# Patient Record
Sex: FEMALE | Race: WHITE | NOT HISPANIC OR LATINO | Employment: OTHER | URBAN - METROPOLITAN AREA
[De-identification: names, ages, dates, MRNs, and addresses within clinical notes are randomized per-mention and may not be internally consistent; named-entity substitution may affect disease eponyms.]

---

## 2024-06-27 RX ORDER — LISINOPRIL 20 MG/1
TABLET ORAL
COMMUNITY
End: 2024-06-28 | Stop reason: ALTCHOICE

## 2024-06-27 RX ORDER — SIMVASTATIN 20 MG
20 TABLET ORAL DAILY
COMMUNITY
End: 2024-06-28 | Stop reason: ALTCHOICE

## 2024-06-27 RX ORDER — NIFEDIPINE 30 MG
TABLET, EXTENDED RELEASE ORAL
COMMUNITY
End: 2024-06-28 | Stop reason: ALTCHOICE

## 2024-06-27 RX ORDER — VALSARTAN 320 MG/1
320 TABLET ORAL DAILY
COMMUNITY
End: 2024-06-28 | Stop reason: ALTCHOICE

## 2024-06-27 RX ORDER — FUROSEMIDE 80 MG
TABLET ORAL
COMMUNITY
Start: 2024-02-05

## 2024-06-27 RX ORDER — ACETAMINOPHEN 500 MG
1000 TABLET ORAL
COMMUNITY

## 2024-06-27 RX ORDER — TOBRAMYCIN 3 MG/ML
SOLUTION/ DROPS OPHTHALMIC
COMMUNITY
End: 2024-06-28 | Stop reason: ALTCHOICE

## 2024-06-27 RX ORDER — GABAPENTIN 100 MG/1
CAPSULE ORAL
COMMUNITY
End: 2024-06-28 | Stop reason: ALTCHOICE

## 2024-06-27 RX ORDER — BISOPROLOL FUMARATE 5 MG/1
2.5 TABLET, FILM COATED ORAL DAILY
COMMUNITY

## 2024-06-27 RX ORDER — NAPROXEN SODIUM 220 MG
1 TABLET ORAL DAILY
COMMUNITY

## 2024-06-27 RX ORDER — LEVOTHYROXINE SODIUM 0.15 MG/1
150 TABLET ORAL DAILY
COMMUNITY
End: 2024-06-28 | Stop reason: DRUGHIGH

## 2024-06-27 RX ORDER — ROSUVASTATIN CALCIUM 20 MG/1
20 TABLET, COATED ORAL
COMMUNITY

## 2024-06-27 RX ORDER — HYDROCHLOROTHIAZIDE 25 MG/1
25 TABLET ORAL DAILY
COMMUNITY
End: 2024-06-28 | Stop reason: ALTCHOICE

## 2024-06-27 RX ORDER — PREDNISOLONE ACETATE 10 MG/ML
SUSPENSION/ DROPS OPHTHALMIC
COMMUNITY

## 2024-06-27 RX ORDER — SIMVASTATIN 20 MG
TABLET ORAL
COMMUNITY
End: 2024-06-28 | Stop reason: ALTCHOICE

## 2024-06-27 RX ORDER — LOSARTAN POTASSIUM 50 MG/1
50 TABLET ORAL DAILY
COMMUNITY
End: 2024-06-28 | Stop reason: ALTCHOICE

## 2024-06-27 RX ORDER — PANTOPRAZOLE SODIUM 40 MG/1
TABLET, DELAYED RELEASE ORAL
COMMUNITY

## 2024-06-28 ENCOUNTER — TELEPHONE (OUTPATIENT)
Dept: ADMINISTRATIVE | Facility: OTHER | Age: 74
End: 2024-06-28

## 2024-06-28 ENCOUNTER — OFFICE VISIT (OUTPATIENT)
Age: 74
End: 2024-06-28
Payer: COMMERCIAL

## 2024-06-28 VITALS
DIASTOLIC BLOOD PRESSURE: 63 MMHG | OXYGEN SATURATION: 95 % | WEIGHT: 208.4 LBS | BODY MASS INDEX: 35.58 KG/M2 | SYSTOLIC BLOOD PRESSURE: 104 MMHG | HEIGHT: 64 IN | HEART RATE: 76 BPM

## 2024-06-28 DIAGNOSIS — Z13.820 SCREENING FOR OSTEOPOROSIS: ICD-10-CM

## 2024-06-28 DIAGNOSIS — Z78.0 ASYMPTOMATIC MENOPAUSAL STATE: ICD-10-CM

## 2024-06-28 DIAGNOSIS — I48.20 CHRONIC ATRIAL FIBRILLATION (HCC): ICD-10-CM

## 2024-06-28 DIAGNOSIS — E06.3 HYPOTHYROIDISM DUE TO HASHIMOTO'S THYROIDITIS: ICD-10-CM

## 2024-06-28 DIAGNOSIS — I10 PRIMARY HYPERTENSION: Primary | ICD-10-CM

## 2024-06-28 DIAGNOSIS — Z11.59 NEED FOR HEPATITIS C SCREENING TEST: ICD-10-CM

## 2024-06-28 DIAGNOSIS — E78.00 HYPERCHOLESTEROLEMIA: ICD-10-CM

## 2024-06-28 DIAGNOSIS — E03.8 HYPOTHYROIDISM DUE TO HASHIMOTO'S THYROIDITIS: ICD-10-CM

## 2024-06-28 PROBLEM — I71.40 AAA (ABDOMINAL AORTIC ANEURYSM) (HCC): Chronic | Status: ACTIVE | Noted: 2019-05-13

## 2024-06-28 PROBLEM — Z80.0 FAMILY HISTORY OF MALIGNANT NEOPLASM OF COLON: Status: ACTIVE | Noted: 2019-01-21

## 2024-06-28 PROBLEM — F32.A DEPRESSIVE DISORDER: Status: ACTIVE | Noted: 2018-10-01

## 2024-06-28 PROBLEM — N28.9 RENAL INSUFFICIENCY: Status: ACTIVE | Noted: 2023-05-11

## 2024-06-28 PROBLEM — R01.1 MURMUR: Status: ACTIVE | Noted: 2018-03-16

## 2024-06-28 PROBLEM — Z86.0100 HISTORY OF COLONIC POLYPS: Status: ACTIVE | Noted: 2022-06-07

## 2024-06-28 PROBLEM — Z98.890 HISTORY OF NEPHROLITHOTOMY WITH REMOVAL OF CALCULI: Status: ACTIVE | Noted: 2018-09-19

## 2024-06-28 PROBLEM — K57.30 DIVERTICULAR DISEASE OF COLON: Status: ACTIVE | Noted: 2022-09-12

## 2024-06-28 PROBLEM — K58.0 IRRITABLE BOWEL SYNDROME WITH DIARRHEA: Status: ACTIVE | Noted: 2022-06-07

## 2024-06-28 PROBLEM — Z86.010 HISTORY OF COLONIC POLYPS: Status: ACTIVE | Noted: 2022-06-07

## 2024-06-28 PROBLEM — I50.9 CONGESTIVE HEART FAILURE (HCC): Status: ACTIVE | Noted: 2021-04-28

## 2024-06-28 PROBLEM — K21.9 GASTROESOPHAGEAL REFLUX DISEASE WITHOUT ESOPHAGITIS: Status: ACTIVE | Noted: 2018-03-16

## 2024-06-28 PROBLEM — I48.91 ATRIAL FIBRILLATION (HCC): Status: ACTIVE | Noted: 2018-03-16

## 2024-06-28 PROBLEM — I82.402 DEEP VEIN THROMBOSIS (DVT) OF LEFT LOWER EXTREMITY (HCC): Status: ACTIVE | Noted: 2021-04-28

## 2024-06-28 PROBLEM — S12.031A CLOSED NONDISPLACED FRACTURE OF POSTERIOR ARCH OF FIRST CERVICAL VERTEBRA (HCC): Chronic | Status: ACTIVE | Noted: 2019-02-04

## 2024-06-28 PROBLEM — I71.20 ANEURYSM OF THORACIC AORTA (HCC): Status: ACTIVE | Noted: 2018-03-16

## 2024-06-28 PROBLEM — Z87.442 HISTORY OF NEPHROLITHOTOMY WITH REMOVAL OF CALCULI: Status: ACTIVE | Noted: 2018-09-19

## 2024-06-28 PROBLEM — R91.1 SOLITARY PULMONARY NODULE: Status: ACTIVE | Noted: 2019-02-04

## 2024-06-28 PROCEDURE — 99214 OFFICE O/P EST MOD 30 MIN: CPT | Performed by: FAMILY MEDICINE

## 2024-06-28 PROCEDURE — G2211 COMPLEX E/M VISIT ADD ON: HCPCS | Performed by: FAMILY MEDICINE

## 2024-06-28 RX ORDER — LEVOTHYROXINE SODIUM 0.12 MG/1
TABLET ORAL
COMMUNITY
Start: 2024-06-27

## 2024-06-28 NOTE — TELEPHONE ENCOUNTER
----- Message from Mariana HUDSON sent at 6/28/2024  7:11 AM EDT -----  Regarding: Care Gap Request  Medicare AWV  06/28/24 7:11 AM    Hello, our patient attached above has had Medicare AWV completed/performed. Please assist in updating the patient chart by pulling the Care Everywhere (CE) document. The date of service is 2/4/2024.     Thank you,  Mariana Deleon [facility]

## 2024-06-28 NOTE — TELEPHONE ENCOUNTER
Upon review of the In Basket request we were able to locate, review, and update the patient chart as requested for Medicare AW.    Any additional questions or concerns should be emailed to the Practice Liaisons via the appropriate education email address, please do not reply via In Basket.    Thank you  Ester Greer MA   [facility]

## 2024-06-28 NOTE — PROGRESS NOTES
"Ambulatory Visit  Name: Marlene Watt      : 1950      MRN: [id number]  Encounter Provider: Cain Wood MD  Encounter Date: 2024   Encounter department: [facility]    Assessment & Plan   1. Primary hypertension  Assessment & Plan:  DASH diet (low saturated fat, cholesterol, and total fat; increase fruits and vegetables; fat free or low fat milk or milk products; and increased fiber). Aerobic exercise and limitation of sodium. Weight loss. Continue same medications.   Orders:  -     Basic metabolic panel; Future; Expected date: 2024  2. Hypercholesterolemia  Assessment & Plan:  Low cholesterol diet. Encourage vegetables, fruit, and whole grains. Exercise.  Continue statin.  Orders:  -     Lipid panel; Future  3. Chronic atrial fibrillation (HCC)  Assessment & Plan:  Continue Xarelto, beta blocker, Calcium channel blocker.  4. Hypothyroidism due to Hashimoto's thyroiditis  Assessment & Plan:  Continue synthroid.  5. Need for hepatitis C screening test  -     Hepatitis C Antibody; Future  6. Screening for osteoporosis  -     DXA bone density spine hip and pelvis; Future; Expected date: 2024  7. Asymptomatic menopausal state  -     DXA bone density spine hip and pelvis; Future; Expected date: 2024    [unfilled]  History of Present Illness     @SFI@    @[facility]@    Objective     /63 (BP Location: Right arm, Patient Position: Sitting, Cuff Size: Standard)   Pulse 76   Ht 5' 4\" (1.626 m)   Wt 94.5 kg (208 lb 6.4 oz)   SpO2 95%   BMI 35.77 kg/m²     [unfilled]  Administrative Statements         Ambulatory Visit  Name: Marlene Watt      : 1950      MRN: [id number]  Encounter Provider: Cain Wood MD  Encounter Date: 2024   Encounter department: [facility]    Assessment & Plan   1. Primary hypertension  Assessment & Plan:  DASH diet (low saturated fat, cholesterol, and total fat; increase fruits and " "vegetables; fat free or low fat milk or milk products; and increased fiber). Aerobic exercise and limitation of sodium. Weight loss. Continue same medications.   Orders:  -     Basic metabolic panel; Future; Expected date: 06/28/2024  2. Hypercholesterolemia  Assessment & Plan:  Low cholesterol diet. Encourage vegetables, fruit, and whole grains. Exercise.  Continue statin.  Orders:  -     Lipid panel; Future  3. Chronic atrial fibrillation (HCC)  Assessment & Plan:  Continue Xarelto, beta blocker, Calcium channel blocker.  4. Hypothyroidism due to Hashimoto's thyroiditis  Assessment & Plan:  Continue synthroid.  5. Need for hepatitis C screening test  -     Hepatitis C Antibody; Future  6. Screening for osteoporosis  -     DXA bone density spine hip and pelvis; Future; Expected date: 07/28/2024  7. Asymptomatic menopausal state  -     DXA bone density spine hip and pelvis; Future; Expected date: 07/28/2024       History of Present Illness       Marlene Watt is here for chronic conditions f/u. Denies chest pain, shortness of breath, headache, or visual symptoms. Reviewed and updated PMHx, PSHx, Family Hx, Allergies, and Meds.  Eating healthy. Walking fair. Gets out of breath easy with the heat.         Review of Systems   Constitutional:  Negative for fatigue.   Respiratory:  Negative for shortness of breath (Exertional).    Cardiovascular:  Negative for chest pain.   Gastrointestinal:  Negative for abdominal pain, constipation and diarrhea.   Genitourinary:  Negative for dysuria.   Neurological:  Negative for dizziness.       Objective     /63 (BP Location: Right arm, Patient Position: Sitting, Cuff Size: Standard)   Pulse 76   Ht 5' 4\" (1.626 m)   Wt 94.5 kg (208 lb 6.4 oz)   SpO2 95%   BMI 35.77 kg/m²     Physical Exam  Vitals and nursing note reviewed.   Constitutional:       Appearance: She is well-developed.   HENT:      Head: Normocephalic and atraumatic.   Eyes:      Conjunctiva/sclera: " Conjunctivae normal.   Cardiovascular:      Rate and Rhythm: Normal rate and regular rhythm.      Heart sounds: Murmur heard.   Pulmonary:      Breath sounds: Normal breath sounds.   Abdominal:      Palpations: Abdomen is soft.   Musculoskeletal:      Cervical back: Neck supple.   Skin:     General: Skin is warm and dry.   Neurological:      Mental Status: She is alert.   Psychiatric:         Mood and Affect: Mood normal.       Administrative Statements

## 2024-08-30 DIAGNOSIS — I48.20 CHRONIC ATRIAL FIBRILLATION (HCC): Primary | ICD-10-CM

## 2024-09-16 DIAGNOSIS — I50.9 CHRONIC CONGESTIVE HEART FAILURE, UNSPECIFIED HEART FAILURE TYPE (HCC): ICD-10-CM

## 2024-09-16 DIAGNOSIS — E06.3 HYPOTHYROIDISM DUE TO HASHIMOTO'S THYROIDITIS: ICD-10-CM

## 2024-09-16 DIAGNOSIS — E78.00 HYPERCHOLESTEROLEMIA: ICD-10-CM

## 2024-09-16 DIAGNOSIS — K21.9 GASTROESOPHAGEAL REFLUX DISEASE WITHOUT ESOPHAGITIS: ICD-10-CM

## 2024-09-16 DIAGNOSIS — F32.A DEPRESSIVE DISORDER: Primary | ICD-10-CM

## 2024-09-16 DIAGNOSIS — E03.8 HYPOTHYROIDISM DUE TO HASHIMOTO'S THYROIDITIS: ICD-10-CM

## 2024-09-16 RX ORDER — LEVOTHYROXINE SODIUM 125 UG/1
125 TABLET ORAL DAILY
Qty: 90 TABLET | Refills: 1 | Status: SHIPPED | OUTPATIENT
Start: 2024-09-16

## 2024-09-16 RX ORDER — PANTOPRAZOLE SODIUM 40 MG/1
40 TABLET, DELAYED RELEASE ORAL DAILY
Qty: 90 TABLET | Refills: 1 | Status: SHIPPED | OUTPATIENT
Start: 2024-09-16

## 2024-09-16 RX ORDER — ROSUVASTATIN CALCIUM 20 MG/1
20 TABLET, COATED ORAL DAILY
Qty: 90 TABLET | Refills: 1 | Status: SHIPPED | OUTPATIENT
Start: 2024-09-16

## 2024-09-16 RX ORDER — FUROSEMIDE 80 MG
80 TABLET ORAL 2 TIMES DAILY
Qty: 180 TABLET | Refills: 1 | Status: SHIPPED | OUTPATIENT
Start: 2024-09-16

## 2024-10-24 DIAGNOSIS — I48.20 CHRONIC ATRIAL FIBRILLATION (HCC): ICD-10-CM

## 2024-10-24 RX ORDER — RIVAROXABAN 20 MG/1
TABLET, FILM COATED ORAL
Qty: 90 TABLET | Refills: 0 | Status: SHIPPED | OUTPATIENT
Start: 2024-10-24 | End: 2024-10-24 | Stop reason: SDUPTHER

## 2024-10-31 ENCOUNTER — RA CDI HCC (OUTPATIENT)
Dept: OTHER | Facility: HOSPITAL | Age: 74
End: 2024-10-31

## 2024-10-31 NOTE — PROGRESS NOTES
HCC coding opportunities          Chart Reviewed number of suggestions sent to Provider: 1     Patients Insurance     Medicare Insurance: [employer] Medicare Advantage          I11.0: Hypertensive heart disease with heart failure (HCC)    Per ICD 10 CM coding guidelines the classification presumes a causal relationship between hypertension and heart involvement and between hypertension unless the documentation clearly states the conditions are unrelated

## 2024-11-18 LAB — HCV AB SER-ACNC: NORMAL

## 2024-11-21 ENCOUNTER — OFFICE VISIT (OUTPATIENT)
Age: 74
End: 2024-11-21
Payer: COMMERCIAL

## 2024-11-21 VITALS
BODY MASS INDEX: 36.54 KG/M2 | OXYGEN SATURATION: 98 % | SYSTOLIC BLOOD PRESSURE: 110 MMHG | HEART RATE: 70 BPM | WEIGHT: 214 LBS | DIASTOLIC BLOOD PRESSURE: 62 MMHG | RESPIRATION RATE: 18 BRPM | HEIGHT: 64 IN

## 2024-11-21 DIAGNOSIS — I48.20 CHRONIC ATRIAL FIBRILLATION (HCC): ICD-10-CM

## 2024-11-21 DIAGNOSIS — G56.01 CARPAL TUNNEL SYNDROME OF RIGHT WRIST: ICD-10-CM

## 2024-11-21 DIAGNOSIS — I10 PRIMARY HYPERTENSION: Primary | ICD-10-CM

## 2024-11-21 DIAGNOSIS — E78.00 HYPERCHOLESTEROLEMIA: ICD-10-CM

## 2024-11-21 DIAGNOSIS — Z23 ENCOUNTER FOR IMMUNIZATION: ICD-10-CM

## 2024-11-21 DIAGNOSIS — E06.3 HYPOTHYROIDISM DUE TO HASHIMOTO THYROIDITIS: ICD-10-CM

## 2024-11-21 PROCEDURE — 90662 IIV NO PRSV INCREASED AG IM: CPT | Performed by: FAMILY MEDICINE

## 2024-11-21 PROCEDURE — 99214 OFFICE O/P EST MOD 30 MIN: CPT | Performed by: FAMILY MEDICINE

## 2024-11-21 PROCEDURE — G0008 ADMIN INFLUENZA VIRUS VAC: HCPCS | Performed by: FAMILY MEDICINE

## 2024-11-21 NOTE — PROGRESS NOTES
Name: Marlene Watt      : 1950      MRN: [id number]  Encounter Provider: Cain Wood MD  Encounter Date: 2024   Encounter department: [facility] PRIMARY [facility]  :  Assessment & Plan  Primary hypertension  DASH diet (low saturated fat, cholesterol, and total fat; increase fruits and vegetables; fat free or low fat milk or milk products; and increased fiber). Aerobic exercise and limitation of sodium. Weight loss. Continue beta blocker.         Chronic atrial fibrillation (HCC)  Continue beta blocker and xarelto.         Hypercholesterolemia  Low cholesterol diet. Encourage vegetables, fruit, and whole grains. Exercise.  Continue statin.         Hypothyroidism due to Hashimoto thyroiditis  Continue synthroid.         Carpal tunnel syndrome of right wrist  Wrist splint, tylenol  Orders:    Cock Up Wrist Splint    Encounter for immunization    Orders:    influenza vaccine, high-dose, PF 0.5 mL (Fluzone High Dose)           History of Present Illness       Marlene Watt is here for chronic conditions f/u. Denies chest pain, shortness of breath, headache, or visual symptoms. Reviewed and updated PMHx, PSHx, Family Hx, Allergies, and Meds.  Recently moved to [employer]. Eating healthy. Compliant with meds. Right arm falls asleep. Getting worse.     Date- 24    FBS- 87  crea- 0.84  eGFR- 72  Total Chol- 130  LDL- 63    -Hepatitis C antibody:   Collection Time: 24 12:00 AM       Result                      Value             Ref Range           HEP C AB                    Non-Reactive                          Review of Systems   Constitutional:  Negative for fatigue.   Respiratory:  Negative for shortness of breath.    Cardiovascular:  Negative for chest pain.   Gastrointestinal:  Negative for abdominal pain, constipation and diarrhea.   Genitourinary:  Negative for dysuria.   Neurological:  Positive for numbness (Right arm.). Negative for dizziness.          Objective   BP  "110/62 (BP Location: Right arm, Patient Position: Sitting, Cuff Size: Standard)   Pulse 70   Resp 18   Ht 5' 4\" (1.626 m)   Wt 97.1 kg (214 lb)   LMP  (LMP Unknown)   SpO2 98%   BMI 36.73 kg/m²      Physical Exam  Vitals and nursing note reviewed.   Constitutional:       Appearance: She is well-developed.   HENT:      Head: Normocephalic and atraumatic.   Eyes:      Conjunctiva/sclera: Conjunctivae normal.   Cardiovascular:      Rate and Rhythm: Normal rate and regular rhythm.      Heart sounds: No murmur heard.  Pulmonary:      Breath sounds: Normal breath sounds.   Abdominal:      Palpations: Abdomen is soft.   Musculoskeletal:      Cervical back: Neck supple.   Skin:     General: Skin is warm and dry.   Neurological:      Mental Status: She is alert.      Sensory: Sensory deficit (Subjective numbness right arm.) present.   Psychiatric:         Mood and Affect: Mood normal.         "

## 2024-11-21 NOTE — ASSESSMENT & PLAN NOTE
DASH diet (low saturated fat, cholesterol, and total fat; increase fruits and vegetables; fat free or low fat milk or milk products; and increased fiber). Aerobic exercise and limitation of sodium. Weight loss. Continue beta blocker.

## 2025-01-17 DIAGNOSIS — F32.A DEPRESSIVE DISORDER: ICD-10-CM

## 2025-01-17 DIAGNOSIS — I50.9 CHRONIC CONGESTIVE HEART FAILURE, UNSPECIFIED HEART FAILURE TYPE (HCC): ICD-10-CM

## 2025-01-17 DIAGNOSIS — E06.3 HYPOTHYROIDISM DUE TO HASHIMOTO'S THYROIDITIS: ICD-10-CM

## 2025-01-17 RX ORDER — LEVOTHYROXINE SODIUM 125 UG/1
125 TABLET ORAL DAILY
Qty: 90 TABLET | Refills: 0 | Status: SHIPPED | OUTPATIENT
Start: 2025-01-17 | End: 2025-01-21 | Stop reason: SDUPTHER

## 2025-01-17 RX ORDER — FUROSEMIDE 80 MG/1
80 TABLET ORAL 2 TIMES DAILY
Qty: 180 TABLET | Refills: 1 | Status: SHIPPED | OUTPATIENT
Start: 2025-01-17 | End: 2025-01-21 | Stop reason: SDUPTHER

## 2025-01-21 RX ORDER — FUROSEMIDE 80 MG/1
80 TABLET ORAL 2 TIMES DAILY
Qty: 180 TABLET | Refills: 1 | Status: SHIPPED | OUTPATIENT
Start: 2025-01-21

## 2025-01-21 RX ORDER — LEVOTHYROXINE SODIUM 125 UG/1
125 TABLET ORAL DAILY
Qty: 90 TABLET | Refills: 1 | Status: SHIPPED | OUTPATIENT
Start: 2025-01-21

## 2025-02-03 ENCOUNTER — TELEPHONE (OUTPATIENT)
Age: 75
End: 2025-02-03

## 2025-03-01 DIAGNOSIS — I48.20 CHRONIC ATRIAL FIBRILLATION (HCC): ICD-10-CM

## 2025-03-03 DIAGNOSIS — I48.20 CHRONIC ATRIAL FIBRILLATION (HCC): ICD-10-CM

## 2025-03-03 RX ORDER — RIVAROXABAN 20 MG/1
20 TABLET, FILM COATED ORAL DAILY
Qty: 90 TABLET | Refills: 0 | Status: SHIPPED | OUTPATIENT
Start: 2025-03-03 | End: 2025-03-03 | Stop reason: SDUPTHER

## 2025-03-07 ENCOUNTER — TRANSITIONAL CARE MANAGEMENT (OUTPATIENT)
Age: 75
End: 2025-03-07

## 2025-03-13 ENCOUNTER — TELEPHONE (OUTPATIENT)
Age: 75
End: 2025-03-13

## 2025-03-13 DIAGNOSIS — R30.0 DYSURIA: Primary | ICD-10-CM

## 2025-03-13 NOTE — TELEPHONE ENCOUNTER
Patient called in requesting a script for a urinalysis pt states she thinks she has a urinary tract infection pt is waiting at the lab to have this test done pt would like the script faxed over to   [facility] Ceball[facility] Fax#[phone]  Please Advise

## 2025-03-13 NOTE — TELEPHONE ENCOUNTER
Pt called about labs, she was told they didn't receive the order, I confirmed the fax and refax the order to the lab.

## 2025-03-13 NOTE — TELEPHONE ENCOUNTER
Patient called back and stated that the last fax number was wrong.  Pls fax to .  She was at the cardio doctor and they requested she get tested for a UTI.  She is at the lab now.

## 2025-03-14 ENCOUNTER — RESULTS FOLLOW-UP (OUTPATIENT)
Age: 75
End: 2025-03-14

## 2025-03-14 DIAGNOSIS — N30.00 ACUTE CYSTITIS WITHOUT HEMATURIA: Primary | ICD-10-CM

## 2025-03-14 RX ORDER — GUAIFENESIN 600 MG/1
600 TABLET, EXTENDED RELEASE ORAL EVERY 12 HOURS SCHEDULED
COMMUNITY
Start: 2025-03-07 | End: 2025-03-18 | Stop reason: ALTCHOICE

## 2025-03-14 RX ORDER — AMIODARONE HYDROCHLORIDE 200 MG/1
200 TABLET ORAL 2 TIMES DAILY
COMMUNITY
Start: 2025-03-04

## 2025-03-14 RX ORDER — EMPAGLIFLOZIN 10 MG/1
10 TABLET, FILM COATED ORAL DAILY
COMMUNITY
Start: 2025-03-07 | End: 2025-03-18 | Stop reason: ALTCHOICE

## 2025-03-14 RX ORDER — CIPROFLOXACIN 250 MG/1
250 TABLET, FILM COATED ORAL 2 TIMES DAILY
Qty: 6 TABLET | Refills: 0 | Status: SHIPPED | OUTPATIENT
Start: 2025-03-14 | End: 2025-03-17

## 2025-03-14 NOTE — TELEPHONE ENCOUNTER
Patient called to check on the status of her U/A results. Patient stated the symptoms are getting worse. Patient was informed that she will get a call as soon as the PCP reviews the results.

## 2025-03-18 ENCOUNTER — OFFICE VISIT (OUTPATIENT)
Age: 75
End: 2025-03-18
Payer: COMMERCIAL

## 2025-03-18 VITALS
HEIGHT: 64 IN | WEIGHT: 223.2 LBS | SYSTOLIC BLOOD PRESSURE: 122 MMHG | DIASTOLIC BLOOD PRESSURE: 88 MMHG | HEART RATE: 74 BPM | RESPIRATION RATE: 18 BRPM | BODY MASS INDEX: 38.1 KG/M2 | OXYGEN SATURATION: 95 %

## 2025-03-18 DIAGNOSIS — E78.00 HYPERCHOLESTEROLEMIA: ICD-10-CM

## 2025-03-18 DIAGNOSIS — K21.9 GASTROESOPHAGEAL REFLUX DISEASE WITHOUT ESOPHAGITIS: ICD-10-CM

## 2025-03-18 DIAGNOSIS — I10 PRIMARY HYPERTENSION: ICD-10-CM

## 2025-03-18 DIAGNOSIS — I48.20 CHRONIC ATRIAL FIBRILLATION (HCC): ICD-10-CM

## 2025-03-18 DIAGNOSIS — I50.23 ACUTE ON CHRONIC SYSTOLIC CONGESTIVE HEART FAILURE (HCC): Primary | ICD-10-CM

## 2025-03-18 PROCEDURE — 99495 TRANSJ CARE MGMT MOD F2F 14D: CPT | Performed by: FAMILY MEDICINE

## 2025-03-18 RX ORDER — BISOPROLOL FUMARATE 5 MG/1
5 TABLET, FILM COATED ORAL DAILY
Qty: 90 TABLET | Refills: 1 | Status: SHIPPED | OUTPATIENT
Start: 2025-03-18

## 2025-03-18 RX ORDER — POTASSIUM CHLORIDE 1500 MG/1
20 TABLET, EXTENDED RELEASE ORAL DAILY
COMMUNITY

## 2025-03-18 NOTE — ASSESSMENT & PLAN NOTE
DASH diet (low saturated fat, cholesterol, and total fat; increase fruits and vegetables; fat free or low fat milk or milk products; and increased fiber). Aerobic exercise and limitation of sodium. Weight loss.

## 2025-03-18 NOTE — ASSESSMENT & PLAN NOTE
Had successful cardioversion. Continue same meds. On amiodarone, bisoprolol, and Xarelto.  Orders:  •  bisoprolol (ZEBETA) 5 mg tablet; Take 1 tablet (5 mg total) by mouth daily

## 2025-03-18 NOTE — PROGRESS NOTES
Transition of Care Visit  Name: Marlene Watt      : 1950      MRN: [id number]  Encounter Provider: Cain Wood MD  Encounter Date: 3/18/2025   Encounter department: [facility] PRIMARY CARE    Assessment & Plan  Acute on chronic systolic congestive heart failure (HCC)  Wt Readings from Last 3 Encounters:   25 101 kg (223 lb 3.2 oz)   24 97.1 kg (214 lb)   24 94.5 kg (208 lb 6.4 oz)     Daily weights.  Continue same meds as per cardiology. Meds updated.   Continue lasix and bisoprolol.         Orders:  •  bisoprolol (ZEBETA) 5 mg tablet; Take 1 tablet (5 mg total) by mouth daily    Chronic atrial fibrillation (HCC)  Had successful cardioversion. Continue same meds. On amiodarone, bisoprolol, and Xarelto.  Orders:  •  bisoprolol (ZEBETA) 5 mg tablet; Take 1 tablet (5 mg total) by mouth daily    Primary hypertension  DASH diet (low saturated fat, cholesterol, and total fat; increase fruits and vegetables; fat free or low fat milk or milk products; and increased fiber). Aerobic exercise and limitation of sodium. Weight loss.             History of Present Illness     Transitional Care Management Review:   Marlene Watt is a 74 y.o. female here for TCM follow up.     During the TCM phone call patient stated:  TCM Call (since 3/4/2025)     Date and time call was made  3/7/2025  8:57 AM    Hospital care reviewed  Records reviewed    Patient was hospitialized at  Other (comment)    Comment  [facility]    Date of Admission  25    Date of discharge  25    Diagnosis  Heart Failure    Disposition  Home    Were the patients medications reviewed and updated  Yes    Current Symptoms  None      TCM Call (since 3/4/2025)     Post hospital issues  None    Should patient be enrolled in anticoag monitoring?  No    Scheduled for follow up?  Yes    Did you obtain your prescribed medications  Yes    Do you need help managing your prescriptions or medications  No    Is  "transportation to your appointment needed  No    I have advised the patient to call PCP with any new or worsening symptoms  CLARICE Chua          Marlene Watt is here for transition visit. Was admitted within 14 days ago with SOB and edema and diagnosed with acute on chronic CHF with LV dysfunction. Was given IV bumex. Also with A fib sith successful OSMAN cardioversion on 3/6/25, Had history of ablation on 2020. May be getting another ablation by Dr. Saeed.       Review of Systems   Constitutional:  Negative for fatigue.   Respiratory:  Positive for shortness of breath (Exertional).    Cardiovascular:  Negative for chest pain.   Gastrointestinal:  Negative for abdominal pain, constipation and diarrhea.   Genitourinary:  Negative for dysuria.   Neurological:  Negative for dizziness.     Objective   /88 (BP Location: Right arm, Patient Position: Sitting, Cuff Size: Standard)   Pulse 74   Resp 18   Ht 5' 4\" (1.626 m)   Wt 101 kg (223 lb 3.2 oz)   SpO2 95%   BMI 38.31 kg/m²     Physical Exam  Vitals and nursing note reviewed.   Constitutional:       Appearance: She is well-developed.   HENT:      Head: Normocephalic and atraumatic.   Eyes:      Conjunctiva/sclera: Conjunctivae normal.   Cardiovascular:      Rate and Rhythm: Normal rate and regular rhythm.      Heart sounds: No murmur heard.  Pulmonary:      Breath sounds: Normal breath sounds.   Abdominal:      Palpations: Abdomen is soft.   Musculoskeletal:      Cervical back: Neck supple.   Skin:     General: Skin is warm and dry.   Neurological:      Mental Status: She is alert.   Psychiatric:         Mood and Affect: Mood normal.           "

## 2025-03-18 NOTE — ASSESSMENT & PLAN NOTE
Wt Readings from Last 3 Encounters:   03/18/25 101 kg (223 lb 3.2 oz)   11/21/24 97.1 kg (214 lb)   06/28/24 94.5 kg (208 lb 6.4 oz)     Daily weights.  Continue same meds as per cardiology. Meds updated.   Continue lasix and bisoprolol.         Orders:  •  bisoprolol (ZEBETA) 5 mg tablet; Take 1 tablet (5 mg total) by mouth daily

## 2025-03-19 RX ORDER — ROSUVASTATIN CALCIUM 20 MG/1
20 TABLET, COATED ORAL DAILY
Qty: 90 TABLET | Refills: 1 | Status: SHIPPED | OUTPATIENT
Start: 2025-03-19

## 2025-03-19 RX ORDER — PANTOPRAZOLE SODIUM 40 MG/1
40 TABLET, DELAYED RELEASE ORAL DAILY
Qty: 90 TABLET | Refills: 1 | Status: SHIPPED | OUTPATIENT
Start: 2025-03-19

## 2025-03-25 ENCOUNTER — RA CDI HCC (OUTPATIENT)
Dept: OTHER | Facility: HOSPITAL | Age: 75
End: 2025-03-25

## 2025-03-25 NOTE — PROGRESS NOTES
HCC coding opportunities          Chart Reviewed number of suggestions sent to Provider: 2     Patients Insurance     Medicare Insurance: [employer] Medicare Advantage          1) I11.0: Hypertensive heart disease with heart failure (HCC)    Per ICD 10 CM coding guidelines the classification presumes a causal relationship between hypertension and heart involvement and between hypertension unless the documentation clearly states the conditions are unrelated    2) found in active problem list and medication list (sertraline (ZOLOFT) 50 mg tablet ) - Can Depression (F33.9) be further classified using any of these diagnosis below.    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

## 2025-04-15 ENCOUNTER — OFFICE VISIT (OUTPATIENT)
Age: 75
End: 2025-04-15
Payer: COMMERCIAL

## 2025-04-15 VITALS
HEIGHT: 64 IN | DIASTOLIC BLOOD PRESSURE: 80 MMHG | SYSTOLIC BLOOD PRESSURE: 110 MMHG | HEART RATE: 64 BPM | BODY MASS INDEX: 46.78 KG/M2 | WEIGHT: 274 LBS | RESPIRATION RATE: 18 BRPM | OXYGEN SATURATION: 95 %

## 2025-04-15 DIAGNOSIS — I10 PRIMARY HYPERTENSION: ICD-10-CM

## 2025-04-15 DIAGNOSIS — C56.9 MALIGNANT NEOPLASM OF OVARY, UNSPECIFIED LATERALITY (HCC): ICD-10-CM

## 2025-04-15 DIAGNOSIS — I48.20 CHRONIC ATRIAL FIBRILLATION (HCC): ICD-10-CM

## 2025-04-15 DIAGNOSIS — Z00.00 MEDICARE ANNUAL WELLNESS VISIT, SUBSEQUENT: Primary | ICD-10-CM

## 2025-04-15 DIAGNOSIS — Z78.0 ASYMPTOMATIC MENOPAUSAL STATE: ICD-10-CM

## 2025-04-15 DIAGNOSIS — N18.32 CHRONIC RENAL FAILURE, STAGE 3B (HCC): ICD-10-CM

## 2025-04-15 DIAGNOSIS — E78.00 HYPERCHOLESTEROLEMIA: ICD-10-CM

## 2025-04-15 PROCEDURE — G0439 PPPS, SUBSEQ VISIT: HCPCS | Performed by: FAMILY MEDICINE

## 2025-04-15 NOTE — PROGRESS NOTES
Name: Marlene Watt      : 1950      MRN: [id number]  Encounter Provider: Cain Wood MD  Encounter Date: 4/15/2025   Encounter department: [facility] PRIMARY CARE  :  Assessment & Plan  Medicare annual wellness visit, subsequent  Dexa ordered.       Primary hypertension  DASH diet (low saturated fat, cholesterol, and total fat; increase fruits and vegetables; fat free or low fat milk or milk products; and increased fiber). Aerobic exercise and limitation of sodium. Weight loss.   Orders:  •  Basic metabolic panel; Future    Chronic atrial fibrillation (HCC)  Continue amiodarone, zebeta, and Xarelto. Had cardioversion. May be getting ablation.       Hypercholesterolemia  Low cholesterol diet. Encourage vegetables, fruit, and whole grains. Exercise.  Continue crestor.  Orders:  •  Lipid panel; Future  •  AST; Future    Chronic renal failure, stage 3b (HCC)  Renal labs.    Lab Results   Component Value Date    EGFR >60.0 2019    EGFR NOT CALCULATED 2019    EGFR NOT CALCULATED 2019    CREATININE 0.7 2019    CREATININE 0.7 2019    CREATININE 0.7 2019     Orders:  •  Calcium; Future  •  Phosphorus; Future  •  PTH, intact; Future    Malignant neoplasm of ovary, unspecified laterality (HCC)  Had surgery in 20s.  Had TAHBSO.        Asymptomatic menopausal state    Orders:  •  DXA bone density spine hip and pelvis; Future       Preventive health issues were discussed with patient, and age appropriate screening tests were ordered as noted in patient's After Visit Summary. Personalized health advice and appropriate referrals for health education or preventive services given if needed, as noted in patient's After Visit Summary.    History of Present Illness       Marlene Watt is here for Medicare annual wellness exam. Denies chest pain, shortness of breath, headache, or visual symptoms. Reviewed and updated PMHx, PSHx, Family Hx, Allergies, and Meds.  Had  cardioversion last Wedness.     Date- 4/4/25 Rony    FBS- 71  crea- 1.39  eGFR- 39  CBC WNL       Patient Care Team:  Cain Wood MD as PCP - General (Family Medicine)    Review of Systems   Constitutional:  Negative for fatigue.   Respiratory:  Negative for shortness of breath.    Cardiovascular:  Negative for chest pain.   Gastrointestinal:  Negative for abdominal pain, constipation and diarrhea.   Genitourinary:  Negative for dysuria.   Neurological:  Negative for dizziness.     Medical History Reviewed by provider this encounter:  Tobacco  Allergies  Meds  Problems  Med Hx  Surg Hx  Fam Hx       Annual Wellness Visit Questionnaire   Marlene is here for her Subsequent Wellness visit. Last Medicare Wellness visit information reviewed, patient interviewed, no change since last AWV.     Health Risk Assessment:   Patient rates overall health as good. Patient feels that their physical health rating is same. Patient is satisfied with their life. Eyesight was rated as same. Hearing was rated as slightly worse. Patient feels that their emotional and mental health rating is slightly better. Patients states they are never, rarely angry. Patient states they are often unusually tired/fatigued. Pain experienced in the last 7 days has been some. Patient's pain rating has been 8/10. Patient states that she has experienced weight loss or gain in last 6 months.     Depression Screening:   PHQ-9 Score: 0      Fall Risk Screening:   In the past year, patient has experienced: history of falling in past year    Number of falls: 1  Injured during fall?: No    Feels unsteady when standing or walking?: Yes    Worried about falling?: Yes      Urinary Incontinence Screening:   Patient has leaked urine accidently in the last six months.     Home Safety:  Patient has trouble with stairs inside or outside of their home. Patient has working smoke alarms and has working carbon monoxide detector. Home safety hazards include: none.      Nutrition:   Current diet is No Added Salt.     Medications:   Patient is currently taking over-the-counter supplements. OTC medications include: see medication list. Patient is able to manage medications.     Activities of Daily Living (ADLs)/Instrumental Activities of Daily Living (IADLs):   Walk and transfer into and out of bed and chair?: Yes  Dress and groom yourself?: Yes    Bathe or shower yourself?: Yes    Feed yourself? Yes  Do your laundry/housekeeping?: Yes  Manage your money, pay your bills and track your expenses?: Yes  Make your own meals?: Yes    Do your own shopping?: Yes    Previous Hospitalizations:   Any hospitalizations or ED visits within the last 12 months?: Yes    How many hospitalizations have you had in the last year?: 3-4    Advance Care Planning:   Living will: No    Durable POA for healthcare: No    Advanced directive: No    Advanced directive counseling given: Yes    ACP document given: Yes    Patient declined ACP directive: No    End of Life Decisions reviewed with patient: Yes    Provider agrees with end of life decisions: Yes      Cognitive Screening:   Provider or family/friend/caregiver concerned regarding cognition?: No    Preventive Screenings      Cardiovascular Screening:    General: Screening Not Indicated and History Lipid Disorder      Cervical Cancer Screening:    General: Screening Not Indicated      Abdominal Aortic Aneurysm (AAA) Screening:        General: Screening Not Indicated and History AAA      Lung Cancer Screening:     General: Screening Not Indicated      Hepatitis C Screening:    General: Screening Current    Immunizations:  - Immunizations due: Zoster (Shingrix)    Screening, Brief Intervention, and Referral to Treatment (SBIRT)     Screening  Typical number of drinks in a day: 0  Typical number of drinks in a week: 0  Interpretation: Low risk drinking behavior.    AUDIT-C Screenin) How often did you have a drink containing alcohol in the past year?  "never  2) How many drinks did you have on a typical day when you were drinking in the past year? 0  3) How often did you have 6 or more drinks on one occasion in the past year? never    AUDIT-C Score: 0  Interpretation: Score 0-2 (female): Negative screen for alcohol misuse    Single Item Drug Screening:  How often have you used an illegal drug (including marijuana) or a prescription medication for non-medical reasons in the past year? never    Single Item Drug Screen Score: 0  Interpretation: Negative screen for possible drug use disorder    Social Drivers of Health     Food Insecurity: No Food Insecurity (4/15/2025)    Hunger Vital Sign    • Worried About Running Out of Food in the Last Year: Never true    • Ran Out of Food in the Last Year: Never true   Transportation Needs: No Transportation Needs (4/15/2025)    PRAPARE - Transportation    • Lack of Transportation (Medical): No    • Lack of Transportation (Non-Medical): No   Housing Stability: Low Risk  (4/15/2025)    Housing Stability Vital Sign    • Unable to Pay for Housing in the Last Year: No    • Number of Times Moved in the Last Year: 1    • Homeless in the Last Year: No   Utilities: Not At Risk (4/15/2025)    [facility] Utilities    • Threatened with loss of utilities: No     No results found.    Objective   /80 (BP Location: Right arm, Patient Position: Sitting, Cuff Size: Standard)   Pulse 64   Resp 18   Ht 5' 4\" (1.626 m)   Wt 124 kg (274 lb)   LMP  (LMP Unknown)   SpO2 95%   BMI 47.03 kg/m²     Physical Exam  Vitals and nursing note reviewed.   Constitutional:       Appearance: She is well-developed.   HENT:      Head: Normocephalic and atraumatic.   Eyes:      Conjunctiva/sclera: Conjunctivae normal.   Cardiovascular:      Rate and Rhythm: Normal rate and regular rhythm.      Heart sounds: No murmur heard.  Pulmonary:      Breath sounds: Normal breath sounds.   Abdominal:      Palpations: Abdomen is soft.   Musculoskeletal:      Cervical back: " Neck supple.   Skin:     General: Skin is warm and dry.   Neurological:      Mental Status: She is alert.   Psychiatric:         Mood and Affect: Mood normal.

## 2025-04-15 NOTE — ASSESSMENT & PLAN NOTE
Low cholesterol diet. Encourage vegetables, fruit, and whole grains. Exercise.  Continue crestor.  Orders:  •  Lipid panel; Future  •  AST; Future

## 2025-04-15 NOTE — PATIENT INSTRUCTIONS
Medicare Preventive Visit Patient Instructions  Thank you for completing your Welcome to Medicare Visit or Medicare Annual Wellness Visit today. Your next wellness visit will be due in one year (4/16/2026).  The screening/preventive services that you may require over the next 5-10 years are detailed below. Some tests may not apply to you based off risk factors and/or age. Screening tests ordered at today's visit but not completed yet may show as past due. Also, please note that scanned in results may not display below.  Preventive Screenings:  Service Recommendations Previous Testing/Comments   Colorectal Cancer Screening  * Colonoscopy    * Fecal Occult Blood Test (FOBT)/Fecal Immunochemical Test (FIT)  * Fecal DNA/Cologuard Test  * Flexible Sigmoidoscopy Age: 45-75 years old   Colonoscopy: every 10 years (may be performed more frequently if at higher risk)  OR  FOBT/FIT: every 1 year  OR  Cologuard: every 3 years  OR  Sigmoidoscopy: every 5 years  Screening may be recommended earlier than age 45 if at higher risk for colorectal cancer. Also, an individualized decision between you and your healthcare provider will decide whether screening between the ages of 76-85 would be appropriate. Colonoscopy: Not on file  FOBT/FIT: Not on file  Cologuard: Not on file  Sigmoidoscopy: Not on file          Breast Cancer Screening Age: 40+ years old  Frequency: every 1-2 years  Not required if history of left and right mastectomy Mammogram: 07/01/2015        Cervical Cancer Screening Between the ages of 21-29, pap smear recommended once every 3 years.   Between the ages of 30-65, can perform pap smear with HPV co-testing every 5 years.   Recommendations may differ for women with a history of total hysterectomy, cervical cancer, or abnormal pap smears in past. Pap Smear: Not on file        Hepatitis C Screening Once for adults born between 1945 and 1965  More frequently in patients at high risk for Hepatitis C Hep C Antibody:  11/18/2024        Diabetes Screening 1-2 times per year if you're at risk for diabetes or have pre-diabetes Fasting glucose: No results in last 5 years (No results in last 5 years)  A1C: No results in last 5 years (No results in last 5 years)      Cholesterol Screening Once every 5 years if you don't have a lipid disorder. May order more often based on risk factors. Lipid panel: Not on file          Other Preventive Screenings Covered by Medicare:  Abdominal Aortic Aneurysm (AAA) Screening: covered once if your at risk. You're considered to be at risk if you have a family history of AAA.  Lung Cancer Screening: covers low dose CT scan once per year if you meet all of the following conditions: (1) Age 55-77; (2) No signs or symptoms of lung cancer; (3) Current smoker or have quit smoking within the last 15 years; (4) You have a tobacco smoking history of at least 20 pack years (packs per day multiplied by number of years you smoked); (5) You get a written order from a healthcare provider.  Glaucoma Screening: covered annually if you're considered high risk: (1) You have diabetes OR (2) Family history of glaucoma OR (3)  aged 50 and older OR (4)  American aged 65 and older  Osteoporosis Screening: covered every 2 years if you meet one of the following conditions: (1) You're estrogen deficient and at risk for osteoporosis based off medical history and other findings; (2) Have a vertebral abnormality; (3) On glucocorticoid therapy for more than 3 months; (4) Have primary hyperparathyroidism; (5) On osteoporosis medications and need to assess response to drug therapy.   Last bone density test (DXA Scan): Not on file.  HIV Screening: covered annually if you're between the age of 15-65. Also covered annually if you are younger than 15 and older than 65 with risk factors for HIV infection. For pregnant patients, it is covered up to 3 times per pregnancy.    Immunizations:  Immunization  Recommendations   Influenza Vaccine Annual influenza vaccination during flu season is recommended for all persons aged >= 6 months who do not have contraindications   Pneumococcal Vaccine   * Pneumococcal conjugate vaccine = PCV13 (Prevnar 13), PCV15 (Vaxneuvance), PCV20 (Prevnar 20)  * Pneumococcal polysaccharide vaccine = PPSV23 (Pneumovax) Adults 19-65 yo with certain risk factors or if 65+ yo  If never received any pneumonia vaccine: recommend Prevnar 20 (PCV20)  Give PCV20 if previously received 1 dose of PCV13 or PPSV23   Hepatitis B Vaccine 3 dose series if at intermediate or high risk (ex: diabetes, end stage renal disease, liver disease)   Respiratory syncytial virus (RSV) Vaccine - COVERED BY MEDICARE PART D  * RSVPreF3 (Arexvy) CDC recommends that adults 60 years of age and older may receive a single dose of RSV vaccine using shared clinical decision-making (SCDM)   Tetanus (Td) Vaccine - COST NOT COVERED BY MEDICARE PART B Following completion of primary series, a booster dose should be given every 10 years to maintain immunity against tetanus. Td may also be given as tetanus wound prophylaxis.   Tdap Vaccine - COST NOT COVERED BY MEDICARE PART B Recommended at least once for all adults. For pregnant patients, recommended with each pregnancy.   Shingles Vaccine (Shingrix) - COST NOT COVERED BY MEDICARE PART B  2 shot series recommended in those 19 years and older who have or will have weakened immune systems or those 50 years and older     Health Maintenance Due:      Topic Date Due   • Hepatitis C Screening  Completed     Immunizations Due:      Topic Date Due   • COVID-19 Vaccine (3 - 2024-25 season) 09/01/2024     Advance Directives   What are advance directives?  Advance directives are legal documents that state your wishes and plans for medical care. These plans are made ahead of time in case you lose your ability to make decisions for yourself. Advance directives can apply to any medical decision,  such as the treatments you want, and if you want to donate organs.   What are the types of advance directives?  There are many types of advance directives, and each state has rules about how to use them. You may choose a combination of any of the following:  Living will:  This is a written record of the treatment you want. You can also choose which treatments you do not want, which to limit, and which to stop at a certain time. This includes surgery, medicine, IV fluid, and tube feedings.   Durable power of  for healthcare (DPAHC):  This is a written record that states who you want to make healthcare choices for you when you are unable to make them for yourself. This person, called a proxy, is usually a family member or a friend. You may choose more than 1 proxy.  Do not resuscitate (DNR) order:  A DNR order is used in case your heart stops beating or you stop breathing. It is a request not to have certain forms of treatment, such as CPR. A DNR order may be included in other types of advance directives.  Medical directive:  This covers the care that you want if you are in a coma, near death, or unable to make decisions for yourself. You can list the treatments you want for each condition. Treatment may include pain medicine, surgery, blood transfusions, dialysis, IV or tube feedings, and a ventilator (breathing machine).  Values history:  This document has questions about your views, beliefs, and how you feel and think about life. This information can help others choose the care that you would choose.  Why are advance directives important?  An advance directive helps you control your care. Although spoken wishes may be used, it is better to have your wishes written down. Spoken wishes can be misunderstood, or not followed. Treatments may be given even if you do not want them. An advance directive may make it easier for your family to make difficult choices about your care.   Weight Management   Why it is  important to manage your weight:  Being overweight increases your risk of health conditions such as heart disease, high blood pressure, type 2 diabetes, and certain types of cancer. It can also increase your risk for osteoarthritis, sleep apnea, and other respiratory problems. Aim for a slow, steady weight loss. Even a small amount of weight loss can lower your risk of health problems.  How to lose weight safely:  A safe and healthy way to lose weight is to eat fewer calories and get regular exercise. You can lose up about 1 pound a week by decreasing the number of calories you eat by 500 calories each day.   Healthy meal plan for weight management:  A healthy meal plan includes a variety of foods, contains fewer calories, and helps you stay healthy. A healthy meal plan includes the following:  Eat whole-grain foods more often.  A healthy meal plan should contain fiber. Fiber is the part of grains, fruits, and vegetables that is not broken down by your body. Whole-grain foods are healthy and provide extra fiber in your diet. Some examples of whole-grain foods are whole-wheat breads and pastas, oatmeal, brown rice, and bulgur.  Eat a variety of vegetables every day.  Include dark, leafy greens such as spinach, kale, debbie greens, and mustard greens. Eat yellow and orange vegetables such as carrots, sweet potatoes, and winter squash.   Eat a variety of fruits every day.  Choose fresh or canned fruit (canned in its own juice or light syrup) instead of juice. Fruit juice has very little or no fiber.  Eat low-fat dairy foods.  Drink fat-free (skim) milk or 1% milk. Eat fat-free yogurt and low-fat cottage cheese. Try low-fat cheeses such as mozzarella and other reduced-fat cheeses.  Choose meat and other protein foods that are low in fat.  Choose beans or other legumes such as split peas or lentils. Choose fish, skinless poultry (chicken or turkey), or lean cuts of red meat (beef or pork). Before you cook meat or  poultry, cut off any visible fat.   Use less fat and oil.  Try baking foods instead of frying them. Add less fat, such as margarine, sour cream, regular salad dressing and mayonnaise to foods. Eat fewer high-fat foods. Some examples of high-fat foods include french fries, doughnuts, ice cream, and cakes.  Eat fewer sweets.  Limit foods and drinks that are high in sugar. This includes candy, cookies, regular soda, and sweetened drinks.  Exercise:  Exercise at least 30 minutes per day on most days of the week. Some examples of exercise include walking, biking, dancing, and swimming. You can also fit in more physical activity by taking the stairs instead of the elevator or parking farther away from stores. Ask your healthcare provider about the best exercise plan for you.      © Copyright [employer] 2018 Information is for End User's use only and may not be sold, redistributed or otherwise used for commercial purposes. All illustrations and images included in CareNotes® are the copyrighted property of [employer] Inc[employer] or [employer]

## 2025-04-15 NOTE — ASSESSMENT & PLAN NOTE
DASH diet (low saturated fat, cholesterol, and total fat; increase fruits and vegetables; fat free or low fat milk or milk products; and increased fiber). Aerobic exercise and limitation of sodium. Weight loss.   Orders:  •  Basic metabolic panel; Future

## 2025-04-15 NOTE — ASSESSMENT & PLAN NOTE
Renal labs.    Lab Results   Component Value Date    EGFR >60.0 02/04/2019    EGFR NOT CALCULATED 02/03/2019    EGFR NOT CALCULATED 02/02/2019    CREATININE 0.7 02/04/2019    CREATININE 0.7 02/03/2019    CREATININE 0.7 02/02/2019     Orders:  •  Calcium; Future  •  Phosphorus; Future  •  PTH, intact; Future

## 2025-04-29 ENCOUNTER — OFFICE VISIT (OUTPATIENT)
Age: 75
End: 2025-04-29
Payer: COMMERCIAL

## 2025-04-29 VITALS
DIASTOLIC BLOOD PRESSURE: 76 MMHG | HEIGHT: 64 IN | SYSTOLIC BLOOD PRESSURE: 128 MMHG | OXYGEN SATURATION: 95 % | HEART RATE: 71 BPM | BODY MASS INDEX: 38.82 KG/M2 | WEIGHT: 227.4 LBS

## 2025-04-29 DIAGNOSIS — I48.20 CHRONIC ATRIAL FIBRILLATION (HCC): ICD-10-CM

## 2025-04-29 DIAGNOSIS — I10 PRIMARY HYPERTENSION: Primary | ICD-10-CM

## 2025-04-29 DIAGNOSIS — E78.00 HYPERCHOLESTEROLEMIA: ICD-10-CM

## 2025-04-29 DIAGNOSIS — J40 BRONCHITIS: ICD-10-CM

## 2025-04-29 PROCEDURE — 99214 OFFICE O/P EST MOD 30 MIN: CPT | Performed by: FAMILY MEDICINE

## 2025-04-29 PROCEDURE — 94640 AIRWAY INHALATION TREATMENT: CPT | Performed by: FAMILY MEDICINE

## 2025-04-29 PROCEDURE — G2211 COMPLEX E/M VISIT ADD ON: HCPCS | Performed by: FAMILY MEDICINE

## 2025-04-29 PROCEDURE — A7003 NEBULIZER ADMINISTRATION SET: HCPCS | Performed by: FAMILY MEDICINE

## 2025-04-29 RX ORDER — ALBUTEROL SULFATE 0.83 MG/ML
2.5 SOLUTION RESPIRATORY (INHALATION) ONCE
Status: COMPLETED | OUTPATIENT
Start: 2025-04-29 | End: 2025-04-29

## 2025-04-29 RX ORDER — ALBUTEROL SULFATE 90 UG/1
2 INHALANT RESPIRATORY (INHALATION) EVERY 6 HOURS PRN
Qty: 6.7 G | Refills: 0 | Status: SHIPPED | OUTPATIENT
Start: 2025-04-29

## 2025-04-29 RX ORDER — DOXYCYCLINE 100 MG/1
CAPSULE ORAL
COMMUNITY
Start: 2025-04-24

## 2025-04-29 RX ADMIN — ALBUTEROL SULFATE 2.5 MG: 0.83 SOLUTION RESPIRATORY (INHALATION) at 13:42

## 2025-04-29 NOTE — ASSESSMENT & PLAN NOTE
Low cholesterol diet. Encourage vegetables, fruit, and whole grains. Exercise.  Continue crestor.

## 2025-04-29 NOTE — PROGRESS NOTES
"Name: Marlene Watt      : 1950      MRN: [id number]  Encounter Provider: Cain Wood MD  Encounter Date: 2025   Encounter department: On license of [facility] PRIMARY CARE  :  Assessment & Plan  Primary hypertension  DASH diet (low saturated fat, cholesterol, and total fat; increase fruits and vegetables; fat free or low fat milk or milk products; and increased fiber). Aerobic exercise and limitation of sodium. Weight loss.        Hypercholesterolemia  Low cholesterol diet. Encourage vegetables, fruit, and whole grains. Exercise.  Continue crestor.       Chronic atrial fibrillation (HCC)  Continue amiodarone, zebeta, and Xarelto. Had cardioversion. May be getting ablation.          Bronchitis    Orders:  •  Nebulizer           History of Present Illness     Marlene Watt is here for chronic conditions f/u. Denies chest pain, shortness of breath, headache, or visual symptoms. Reviewed and updated PMHx, PSHx, Family Hx, Allergies, and Meds. Pt symptoms are cough and  body aches , pt symptoms started last Wednesday. Went to [facility]. Was given doxycycline and benzonatate. Tested for covid and was negative. CXR was negative.      Review of Systems   Constitutional:  Negative for fatigue.   Respiratory:  Negative for shortness of breath.    Cardiovascular:  Negative for chest pain.   Gastrointestinal:  Negative for abdominal pain, constipation and diarrhea.   Genitourinary:  Negative for dysuria.   Neurological:  Negative for dizziness.       Objective   /76 (BP Location: Left arm, Patient Position: Sitting, Cuff Size: Standard)   Pulse 71   Ht 5' 4\" (1.626 m)   Wt 103 kg (227 lb 6.4 oz)   LMP  (LMP Unknown)   SpO2 95%   BMI 39.03 kg/m²      Physical Exam  Vitals and nursing note reviewed.   Constitutional:       Appearance: She is well-developed.   HENT:      Head: Normocephalic and atraumatic.   Eyes:      Conjunctiva/sclera: Conjunctivae normal.   Cardiovascular:      Rate and Rhythm: " Normal rate and regular rhythm.      Heart sounds: No murmur heard.  Pulmonary:      Breath sounds: Normal breath sounds.   Abdominal:      Palpations: Abdomen is soft.   Musculoskeletal:      Cervical back: Neck supple.   Skin:     General: Skin is warm and dry.   Neurological:      Mental Status: She is alert.   Psychiatric:         Mood and Affect: Mood normal.

## 2025-06-02 ENCOUNTER — TELEPHONE (OUTPATIENT)
Age: 75
End: 2025-06-02

## 2025-06-02 ENCOUNTER — TRANSITIONAL CARE MANAGEMENT (OUTPATIENT)
Age: 75
End: 2025-06-02

## 2025-06-02 NOTE — TELEPHONE ENCOUNTER
Pt recently seen in [facility] and was in for a heart ablation. Pt has TCM scheduled June 30th (refused to go to [place] or see Pamela Cullen sooner). Pt was requesting if you can take a look at her recent labs to review. Pt states her thyroid and other labs were not good and she would like you to see if pt needs sooner appt. Thanks!

## 2025-06-02 NOTE — TELEPHONE ENCOUNTER
Thyroid labs abnormal. Follow up soon as scheduled to discuss treatment options. Can have sooner appt if requested.

## 2025-06-03 ENCOUNTER — TELEPHONE (OUTPATIENT)
Age: 75
End: 2025-06-03

## 2025-06-03 DIAGNOSIS — E06.3 HYPOTHYROIDISM DUE TO HASHIMOTO THYROIDITIS: Primary | ICD-10-CM

## 2025-06-03 RX ORDER — LEVOTHYROXINE SODIUM 150 UG/1
150 TABLET ORAL
Qty: 90 TABLET | Refills: 0 | Status: SHIPPED | OUTPATIENT
Start: 2025-06-03

## 2025-06-03 NOTE — TELEPHONE ENCOUNTER
Thyroid labs abnormal. Increase thyroid med to 150 mcg daily and called it to pharmacy. Fatigue is likely caused by thyroid.

## 2025-06-03 NOTE — TELEPHONE ENCOUNTER
Patient called in regards to wanting provider to review her blood work as she has some abnormal results. Patient stated that her TSH and kidney functions results are abnormal and she has been very fatigue. Patient requesting a call back from provider.

## 2025-06-27 RX ORDER — RIVAROXABAN 15 MG/1
1 TABLET, FILM COATED ORAL DAILY
COMMUNITY
Start: 2025-05-08 | End: 2025-06-30 | Stop reason: SDUPTHER

## 2025-06-27 RX ORDER — POTASSIUM CHLORIDE 1500 MG/1
1 TABLET, EXTENDED RELEASE ORAL DAILY
COMMUNITY
Start: 2025-03-05

## 2025-06-27 RX ORDER — CHONDROITIN/COLLAGEN/HYALURON 500 MG
1 CAPSULE ORAL DAILY
COMMUNITY
Start: 2025-04-08

## 2025-06-30 ENCOUNTER — OFFICE VISIT (OUTPATIENT)
Age: 75
End: 2025-06-30
Payer: COMMERCIAL

## 2025-06-30 VITALS
OXYGEN SATURATION: 92 % | WEIGHT: 228.6 LBS | SYSTOLIC BLOOD PRESSURE: 130 MMHG | BODY MASS INDEX: 39.03 KG/M2 | HEART RATE: 65 BPM | HEIGHT: 64 IN | DIASTOLIC BLOOD PRESSURE: 80 MMHG | RESPIRATION RATE: 18 BRPM

## 2025-06-30 DIAGNOSIS — I10 PRIMARY HYPERTENSION: ICD-10-CM

## 2025-06-30 DIAGNOSIS — I50.23 ACUTE ON CHRONIC SYSTOLIC CONGESTIVE HEART FAILURE (HCC): ICD-10-CM

## 2025-06-30 DIAGNOSIS — E06.3 HYPOTHYROIDISM DUE TO HASHIMOTO THYROIDITIS: ICD-10-CM

## 2025-06-30 DIAGNOSIS — I48.20 CHRONIC ATRIAL FIBRILLATION (HCC): Primary | ICD-10-CM

## 2025-06-30 DIAGNOSIS — E66.01 MORBID OBESITY DUE TO EXCESS CALORIES (HCC): ICD-10-CM

## 2025-06-30 PROCEDURE — 99214 OFFICE O/P EST MOD 30 MIN: CPT | Performed by: FAMILY MEDICINE

## 2025-06-30 PROCEDURE — G2211 COMPLEX E/M VISIT ADD ON: HCPCS | Performed by: FAMILY MEDICINE

## 2025-06-30 RX ORDER — CYCLOSPORINE 0.5 MG/ML
1 EMULSION OPHTHALMIC EVERY 12 HOURS
COMMUNITY
Start: 2025-06-26

## 2025-06-30 RX ORDER — RIVAROXABAN 15 MG/1
15 TABLET, FILM COATED ORAL DAILY
Qty: 90 TABLET | Refills: 1 | Status: SHIPPED | OUTPATIENT
Start: 2025-06-30

## 2025-06-30 NOTE — ASSESSMENT & PLAN NOTE
DASH diet (low saturated fat, cholesterol, and total fat; increase fruits and vegetables; fat free or low fat milk or milk products; and increased fiber). Aerobic exercise and limitation of sodium. Maintain healthy weight.  Continue bisoprolol.

## 2025-06-30 NOTE — ASSESSMENT & PLAN NOTE
Synthroid increased.   Orders:  •  Xarelto 15 MG tablet; Take 1 tablet (15 mg total) by mouth in the morning

## 2025-06-30 NOTE — PROGRESS NOTES
"Name: Marlene Watt      : 1950      MRN: [id number]  Encounter Provider: Cain Wood MD  Encounter Date: 2025   Encounter department: [facility] PRIMARY CARE  :  Assessment & Plan  Chronic atrial fibrillation (HCC)  Had ablation. Will be seeing Dr. Paz . Meds updated. Xarelto was decreased.  Continue Xarelto, amiodarone, bisoprolol.       Hypothyroidism due to Hashimoto thyroiditis  Synthroid increased.   Orders:  •  Xarelto 15 MG tablet; Take 1 tablet (15 mg total) by mouth in the morning    Primary hypertension  DASH diet (low saturated fat, cholesterol, and total fat; increase fruits and vegetables; fat free or low fat milk or milk products; and increased fiber). Aerobic exercise and limitation of sodium. Maintain healthy weight.  Continue bisoprolol.        Acute on chronic systolic congestive heart failure (HCC)  Wt Readings from Last 3 Encounters:   25 104 kg (228 lb 9.6 oz)   25 103 kg (227 lb 6.4 oz)   04/15/25 124 kg (274 lb)     Continue lasix and bisoprolol.                Morbid obesity due to excess calories (HCC)                History of Present Illness     Marlene Watt is here for post hospital visit 3 weeks. Had ablation for a. Fib. Xarelto decreased to 15. Synthroid increased to 150 2025, Denies chest pain, headache, or visual symptoms. Reviewed and updated PMHx, PSHx, Family Hx, Allergies, and Meds.  Sees Dr. Paz on .       Review of Systems   Constitutional:  Negative for fatigue.   Respiratory:  Negative for shortness of breath.    Cardiovascular:  Negative for chest pain.   Gastrointestinal:  Negative for abdominal pain, constipation and diarrhea.   Genitourinary:  Negative for dysuria.   Neurological:  Negative for dizziness.       Objective   /80 (BP Location: Left arm, Patient Position: Sitting, Cuff Size: Standard)   Pulse 65   Resp 18   Ht 5' 4\" (1.626 m)   Wt 104 kg (228 lb 9.6 oz)   LMP  (LMP Unknown)   SpO2 " 92%   BMI 39.24 kg/m²      Physical Exam  Vitals and nursing note reviewed.   Constitutional:       Appearance: She is well-developed.   HENT:      Head: Normocephalic and atraumatic.     Eyes:      Conjunctiva/sclera: Conjunctivae normal.       Cardiovascular:      Rate and Rhythm: Normal rate and regular rhythm.      Heart sounds: No murmur heard.  Pulmonary:      Breath sounds: Normal breath sounds.   Abdominal:      Palpations: Abdomen is soft.     Musculoskeletal:      Cervical back: Neck supple.     Skin:     General: Skin is warm and dry.     Neurological:      Mental Status: She is alert.     Psychiatric:         Mood and Affect: Mood normal.

## 2025-06-30 NOTE — ASSESSMENT & PLAN NOTE
Had ablation. Will be seeing Dr. Paz July 8. Meds updated. Xarelto was decreased.  Continue Xarelto, amiodarone, bisoprolol.

## 2025-06-30 NOTE — ASSESSMENT & PLAN NOTE
Wt Readings from Last 3 Encounters:   06/30/25 104 kg (228 lb 9.6 oz)   04/29/25 103 kg (227 lb 6.4 oz)   04/15/25 124 kg (274 lb)     Continue lasix and bisoprolol.

## 2025-07-18 ENCOUNTER — TELEPHONE (OUTPATIENT)
Age: 75
End: 2025-07-18

## 2025-07-18 ENCOUNTER — TRANSITIONAL CARE MANAGEMENT (OUTPATIENT)
Age: 75
End: 2025-07-18

## 2025-07-18 NOTE — TELEPHONE ENCOUNTER
Cheryl adamson/[facility] called to schedule patient with TCM appt.       Hospital: [facility]  Admission Date: 7/13/25  Discharge Date: 7/18/25       Warm transferred to [place] with clerical to assist with TCM scheduling.

## 2025-07-18 NOTE — TELEPHONE ENCOUNTER
Nadine from [facility] called and stated patient had just returned home from the hospital and they will need to see her over the weekend. Due to the timing Nadine was asking for a verbal confirmation to have PT/occupational therapy and evaluation and treat for the patient. I was able to successfully warm transfer patient to office clinical for further assistance.

## 2025-07-18 NOTE — TELEPHONE ENCOUNTER
Patient calling to reschedule transition of care visit sooner.      Poornima transferred patient to Yahaira at [facility] clerical team for further assistance. She thanks us for our help!

## 2025-07-21 ENCOUNTER — TELEPHONE (OUTPATIENT)
Age: 75
End: 2025-07-21

## 2025-07-21 RX ORDER — MIDODRINE HYDROCHLORIDE 5 MG/1
5 TABLET ORAL
COMMUNITY
Start: 2025-07-18

## 2025-07-21 RX ORDER — ASPIRIN 81 MG/1
1 TABLET, COATED ORAL DAILY
COMMUNITY
Start: 2025-07-18 | End: 2025-07-23 | Stop reason: ALTCHOICE

## 2025-07-21 RX ORDER — METOLAZONE 2.5 MG/1
2.5 TABLET ORAL 2 TIMES WEEKLY
COMMUNITY
Start: 2025-07-08 | End: 2025-07-23 | Stop reason: ALTCHOICE

## 2025-07-21 RX ORDER — AMOXICILLIN 500 MG/1
500 CAPSULE ORAL 3 TIMES DAILY
COMMUNITY
Start: 2025-07-18 | End: 2025-07-23 | Stop reason: ALTCHOICE

## 2025-07-21 NOTE — TELEPHONE ENCOUNTER
Liz a OT with [facility] called in regards to seeing patient for a eval today. Liz would like to do 1 addition visit in 2 weeks and would like providers okay. Liz's call back number is [phone].

## 2025-07-24 ENCOUNTER — OFFICE VISIT (OUTPATIENT)
Age: 75
End: 2025-07-24
Payer: COMMERCIAL

## 2025-07-24 VITALS
RESPIRATION RATE: 18 BRPM | HEIGHT: 64 IN | OXYGEN SATURATION: 96 % | HEART RATE: 63 BPM | WEIGHT: 227.2 LBS | DIASTOLIC BLOOD PRESSURE: 62 MMHG | SYSTOLIC BLOOD PRESSURE: 110 MMHG | BODY MASS INDEX: 38.79 KG/M2

## 2025-07-24 DIAGNOSIS — I48.20 CHRONIC ATRIAL FIBRILLATION (HCC): ICD-10-CM

## 2025-07-24 DIAGNOSIS — F32.A DEPRESSIVE DISORDER: ICD-10-CM

## 2025-07-24 DIAGNOSIS — N30.00 ACUTE CYSTITIS WITHOUT HEMATURIA: ICD-10-CM

## 2025-07-24 DIAGNOSIS — E06.3 HYPOTHYROIDISM DUE TO HASHIMOTO THYROIDITIS: ICD-10-CM

## 2025-07-24 DIAGNOSIS — I10 PRIMARY HYPERTENSION: ICD-10-CM

## 2025-07-24 DIAGNOSIS — E78.00 HYPERCHOLESTEROLEMIA: ICD-10-CM

## 2025-07-24 DIAGNOSIS — R55 SYNCOPE, UNSPECIFIED SYNCOPE TYPE: Primary | ICD-10-CM

## 2025-07-24 DIAGNOSIS — I50.23 ACUTE ON CHRONIC SYSTOLIC CONGESTIVE HEART FAILURE (HCC): ICD-10-CM

## 2025-07-24 DIAGNOSIS — I95.1 ORTHOSTATIC HYPOTENSION: ICD-10-CM

## 2025-07-24 PROCEDURE — 99496 TRANSJ CARE MGMT HIGH F2F 7D: CPT | Performed by: FAMILY MEDICINE

## 2025-07-24 RX ORDER — BISOPROLOL FUMARATE 5 MG/1
2.5 TABLET, FILM COATED ORAL DAILY
COMMUNITY

## 2025-07-24 RX ORDER — LEVOTHYROXINE SODIUM 150 UG/1
150 TABLET ORAL
Qty: 90 TABLET | Refills: 1 | Status: SHIPPED | OUTPATIENT
Start: 2025-07-24

## 2025-07-24 RX ORDER — ROSUVASTATIN CALCIUM 20 MG/1
20 TABLET, COATED ORAL DAILY
Qty: 90 TABLET | Refills: 1 | Status: SHIPPED | OUTPATIENT
Start: 2025-07-24

## 2025-07-24 RX ORDER — FUROSEMIDE 80 MG/1
80 TABLET ORAL DAILY
COMMUNITY

## 2025-07-24 NOTE — ASSESSMENT & PLAN NOTE
Orders:  •  levothyroxine 150 mcg tablet; Take 1 tablet (150 mcg total) by mouth daily in the early morning

## 2025-07-24 NOTE — PROGRESS NOTES
Transition of Care Visit:  Name: Marlene Watt      : 1950      MRN: [id number]  Encounter Provider: Cain Wood MD  Encounter Date: 2025   Encounter department: [facility]    Assessment & Plan  Syncope, unspecified syncope type  Secondary to orthostatic hypotension. Acute with reported episode of syncope. Was secondary to dehydration with high-dose Lasix and recent addition of metolazone -Symptomatic orthostatic. Bisoprolol decreased to 2.5 mg at bedtime -Midodrine 5 increased to TID       Orthostatic hypotension  Discharge summary reviewed. Medications updated. Sees cardiology.       Acute cystitis without hematuria  Increase fluid intake, proper hygiene, cranberry juice. Recurring UTI's may require further testing to determine the cause.  Finish amoxicillin.        Primary hypertension  DASH diet (low saturated fat, cholesterol, and total fat; increase fruits and vegetables; fat free or low fat milk or milk products; and increased fiber). Aerobic exercise and limitation of sodium. Maintain healthy weight.  Medications updated.  Orders:  •  Basic metabolic panel; Future    Acute on chronic systolic congestive heart failure (HCC)  Wt Readings from Last 3 Encounters:   25 103 kg (227 lb 3.2 oz)   25 104 kg (228 lb 9.6 oz)   25 103 kg (227 lb 6.4 oz)            Chronic atrial fibrillation (HCC)  Had cardioversion and ablation. Is in sinus rhythm.        Hypercholesterolemia    Orders:  •  rosuvastatin (CRESTOR) 20 MG tablet; Take 1 tablet (20 mg total) by mouth daily    Depressive disorder      Orders:  •  sertraline (ZOLOFT) 50 mg tablet; Take 1 tablet (50 mg total) by mouth daily    Hypothyroidism due to Hashimoto thyroiditis    Orders:  •  levothyroxine 150 mcg tablet; Take 1 tablet (150 mcg total) by mouth daily in the early morning         History of Present Illness     Transitional Care Management Review:   Marlene Watt is a 75 y.o. female here for TCM  follow up.     During the TCM phone call patient stated:  TCM Call (since 7/10/2025)     Date and time call was made  7/18/2025  1:10 PM    Hospital care reviewed  Records reviewed    Patient was hospitialized at  Other (comment)    Comment  [facility]'s    Date of Admission  07/15/25    Date of discharge  07/18/25    Diagnosis  CHF and Vertigo    Disposition  Home    Were the patients medications reviewed and updated  Yes    Current Symptoms  None      TCM Call (since 7/10/2025)     Post hospital issues  None    Scheduled for follow up?  Yes    Patients specialists  Cardiology    Did you obtain your prescribed medications  Yes    Do you need help managing your prescriptions or medications  No    Is transportation to your appointment needed  No    I have advised the patient to call PCP with any new or worsening symptoms  CLARICE Chua    Living Arrangements  Alone    Support System  None    Are you recieving home care services  No          Marlene Watt is here for transition visit.     Hospital course.   Has a past medical history significant for HFpEF, pulmonary hypertension, A-fib status post ablation and permanent pacemaker, CKD 3, aortic aneurysm, hypothyroidism, and anxiety. She presented to the ED due to complaints of lightheadedness and dizziness when going from sitting to standing resulting in syncope on at least 1 occasion. During the hospitalization patient has been treated for orthostatic hypotension. Patient admitted with dizziness and found to be orthostatic. High-dose home diuretics Lasix 120 mg every morning, 80 mg every afternoon were held. She was started on midodrine and increased to 5 mg 3 times daily along with reduction of bisoprolol to 2.5 mg at bedtime with improvement status hypotension.. Diuretics were resumed on 7/17 significantly reduced dose from her home regimen Lasix 40 mg daily, and seems to be tolerating.       Review of Systems   Constitutional:  Negative for fatigue.  "  Respiratory:  Negative for shortness of breath.    Cardiovascular:  Negative for chest pain.   Gastrointestinal:  Negative for abdominal pain, constipation and diarrhea.   Genitourinary:  Negative for dysuria.   Neurological:  Negative for dizziness.     Objective   /62 (BP Location: Left arm, Patient Position: Sitting, Cuff Size: Standard)   Pulse 63   Resp 18   Ht 5' 4\" (1.626 m)   Wt 103 kg (227 lb 3.2 oz)   LMP  (LMP Unknown)   SpO2 96%   BMI 39.00 kg/m²     Physical Exam  Vitals and nursing note reviewed.   Constitutional:       Appearance: She is well-developed.   HENT:      Head: Normocephalic and atraumatic.     Eyes:      Conjunctiva/sclera: Conjunctivae normal.       Cardiovascular:      Rate and Rhythm: Normal rate and regular rhythm.      Heart sounds: No murmur heard.  Pulmonary:      Breath sounds: Normal breath sounds.   Abdominal:      Palpations: Abdomen is soft.     Musculoskeletal:      Cervical back: Neck supple.     Skin:     General: Skin is warm and dry.     Neurological:      Mental Status: She is alert.     Psychiatric:         Mood and Affect: Mood normal.       Medications have been reviewed by provider in current encounter      "

## 2025-07-24 NOTE — ASSESSMENT & PLAN NOTE
DASH diet (low saturated fat, cholesterol, and total fat; increase fruits and vegetables; fat free or low fat milk or milk products; and increased fiber). Aerobic exercise and limitation of sodium. Maintain healthy weight.  Medications updated.  Orders:  •  Basic metabolic panel; Future

## 2025-07-24 NOTE — ASSESSMENT & PLAN NOTE
Wt Readings from Last 3 Encounters:   07/24/25 103 kg (227 lb 3.2 oz)   06/30/25 104 kg (228 lb 9.6 oz)   04/29/25 103 kg (227 lb 6.4 oz)

## 2025-08-12 ENCOUNTER — OFFICE VISIT (OUTPATIENT)
Age: 75
End: 2025-08-12
Payer: COMMERCIAL